# Patient Record
Sex: MALE | Race: WHITE | Employment: OTHER | RURAL
[De-identification: names, ages, dates, MRNs, and addresses within clinical notes are randomized per-mention and may not be internally consistent; named-entity substitution may affect disease eponyms.]

---

## 2017-04-17 RX ORDER — LABETALOL 100 MG/1
100 TABLET, FILM COATED ORAL 2 TIMES DAILY
Qty: 60 TAB | Refills: 11 | Status: SHIPPED | OUTPATIENT
Start: 2017-04-17 | End: 2018-04-13 | Stop reason: SDUPTHER

## 2017-08-21 ENCOUNTER — OFFICE VISIT (OUTPATIENT)
Dept: FAMILY MEDICINE CLINIC | Age: 82
End: 2017-08-21

## 2017-08-21 DIAGNOSIS — E11.9 TYPE 2 DIABETES MELLITUS WITHOUT COMPLICATION, WITHOUT LONG-TERM CURRENT USE OF INSULIN (HCC): ICD-10-CM

## 2017-08-21 DIAGNOSIS — Z00.00 MEDICARE ANNUAL WELLNESS VISIT, SUBSEQUENT: Primary | ICD-10-CM

## 2017-08-21 DIAGNOSIS — I10 ESSENTIAL HYPERTENSION: ICD-10-CM

## 2017-08-21 NOTE — ACP (ADVANCE CARE PLANNING)
In the event that he is unable to speak for himself, please contact his wife, Arya Kaplan, at [phone] [facility]

## 2017-08-21 NOTE — PROGRESS NOTES
Chief Complaint   Patient presents with    Annual Wellness Visit         HPI:      Jamari Pedersen is a 80 y.o. male who lives at [facility], [place], operated by [facility]. History of urticaria, HTN and T2DM. Has not been seen for several months. Due for labs and SAWV. As a result of a CT of the L/S spine in the fall of 2015, he was noted to have a 1.4 cm left adrenal adenoma and a 3.4 cm infrarenal AAA. AODM has been well controlled with diet. He has several skin concerns. FS at home are all < 200. SBP at home are all < 15o      Allergies   Allergen Reactions    Zyloprim [Allopurinol] Hives    Sulfa (Sulfonamide Antibiotics) Unknown (comments)    Sulfa (Sulfonamide Antibiotics) Other (comments)     Questions whether he really has a sensitivity.  Zyloprim [Allopurinol] Rash       Current Outpatient Prescriptions   Medication Sig    Vit A,C,E-Zinc-Copper (ICAPS AREDS) cap capsule Take 1 Cap by mouth.  ONETOUCH ULTRA TEST strip USE TO CHECK BLOOD SUGAR ONCE DAILY OR AS DIRECTED BY DOCTOR DANIELLA    lisinopril (PRINIVIL, ZESTRIL) 10 mg tablet TAKE ONE TABLET BY MOUTH ONCE DAILY    NIFEDICAL XL 60 mg ER tablet TAKE 1 TABLET(S) BY MOUTH DAILY    probenecid-colchicine (COLBENEMID) 500-0.5 mg per tablet TAKE ONE TABLET   BY MOUTH EVERY MORNING    labetalol (NORMODYNE) 100 mg tablet Take 1 Tab by mouth two (2) times a day.  raNITIdine (ZANTAC) 150 mg tablet TAKE ONE TABLET BY MOUTH TWICE DAILY    cyanocobalamin (VITAMIN B12) 1,000 mcg/mL injection 1 ML INJECTION EVERY MONTH    ranitidine hcl 150 mg capsule Take 150 mg by mouth two (2) times a day.  colchicine-probenecid (COLBENEMID) 0.5-500 mg per tablet Take 1 Tab by mouth daily.  NIFEdipine ER (NIFEDICAL XL) 60 mg ER tablet Take 60 mg by mouth daily.  LORazepam (ATIVAN) 1 mg tablet Take 1 mg by mouth as needed for Anxiety. Rarely uses. Maybe 1/2 tab in a day when he takes it.     multivitamin, tx-iron-ca-min (THERA-M W/ IRON) 9 mg iron-400 mcg tab tablet Take 1 Tab by mouth daily.  calcium-magnesium-zinc 333-133-5 mg tab Take  by mouth.  senna-docusate (SENOKOT-S) 8.6-50 mg per tablet Take 3 Tabs by mouth daily.  OTHER Will Leg Cramps. Two tabs q 4 hrs. Prn leg cramps. At least QID.  Propylene Glycol-Glycerin (SOOTHE LUBRICANT) 0.6-0.6 % dpet Apply  to eye as needed.  garlic 3 mg cap Take  by mouth daily. Two tabs every day,    clobetasol (TEMOVATE) 0.05 % ointment Apply  to affected area two (2) times a day.  aspirin (ASPIRIN) 325 mg tablet Take 325 mg by mouth two (2) times a day. No current facility-administered medications for this visit. Past Medical History:   Diagnosis Date    Adrenal cortical adenoma of left adrenal gland 2015    Incidental finding on L/S CT scan    Arthritis     CAD (coronary artery disease)     [facility] [address]     Gout     Hypertension     Low back pain radiating to right leg     Pernicious anemia     Renal stones     Stroke ([facility] [address]     Trigeminy          ROS:  Denies fever, chills, cough, chest pain, SOB,  nausea, vomiting, or diarrhea. Denies wt loss, wt gain, hemoptysis, hematochezia or melena. Physical Examination:    There were no vitals taken for this visit. General: Alert and Ox3, Fluent speech  HEENT:  NC/AT, EOMI, OP: clear  Neck:  Supple, no adenopathy, JVD, mass or bruit  Chest:  Clear to Ausculation, without wheezes, rales, rubs or ronchi  Cardiac: RRR  Abdomen:  +BS, soft, nontender without palpable HSM  Extremities:  No cyanosis, clubbing or edema  Neurologic:  Ambulatory without assist, CN 2-12 grossly intact. Moves all extremities. Skin: no rash  Lymphadenopathy: no cervical or supraclavicular nodes  Diabetic Foot Exam:  Both feet are examined and demonstrate intact DP pulses. There is good capillary refill and sensation is intact. Skin is intact and there are no ulcers or sores. ASSESSMENT AND PLAN:     1. SAWV today  2. T2DM:  Due for labs.   FS at home are excellent  3. Essential HTN:  At goal at home most of the time    Orders Placed This Encounter    CBC WITH AUTOMATED DIFF    LIPID PANEL    METABOLIC PANEL, COMPREHENSIVE    HEMOGLOBIN A1C WITH EAG    MICROALBUMIN, UR, RAND W/ MICROALBUMIN/CREA RATIO     DIABETES FOOT EXAM       Doyle Chong MD, FACP        ______________________________________________________________________    Marleen Parsons is a 80 y.o. male and presents for annual Medicare Wellness Visit. Problem List: Reviewed with patient and discussed risk factors. Patient Active Problem List   Diagnosis Code    Hypertension I10    CVA (cerebral infarction) I63.9    Esophageal stricture K22.2    Low back pain radiating to right leg M54.5    Actinic keratosis L57.0    Essential hypertension I10    Adrenal cortical adenoma of left adrenal gland D35.02    Type 2 diabetes mellitus without complication, without long-term current use of insulin (HCC) E11.9       Current medical providers:  Patient Care Team:  Carmen De La Cruz MD as PCP - General (Internal Medicine)  Abby Pop MD (Surgery)  Loulou Arauz MD (General Surgery)    [facility], [address] [place] [address], Medications/Allergies: reviewed, on chart. Male Alcohol Screening: On any occasion during the past 3 months, have you had more than 4 drinks containing alcohol? No    Do you average more than 14 drinks per week? No    ROS:  Constitutional: No fever, chills or weight loss  Respiratory: No cough, SOB   CV: No chest pain or Palpitations    Objective: There were no vitals taken for this visit. There is no height or weight on file to calculate BMI. Assessment of cognitive impairment: Alert and oriented x 3    Depression Screen:   PHQ over the last two weeks 8/18/2016   Little interest or pleasure in doing things Not at all   Feeling down, depressed or hopeless Not at all   Total Score PHQ 2 0       Fall Risk Assessment:    Fall Risk Assessment, last 12 mths 8/18/2016   Able to walk?  Yes Fall in past 12 months? No   Fall with injury? -   Number of falls in past 12 months -   Fall Risk Score -       Functional Ability:   Does the patient exhibit a steady gait? yes   How long did it take the patient to get up and walk from a sitting position? 12 sec   Is the patient self reliant?  (ie can do own laundry, meals, household chores)  yes     Does the patient handle his/her own medications? yes     Does the patient handle his/her own money? yes     Is the patients home safe (ie good lighting, handrails on stairs and bath, etc.)? yes     Did you notice or did patient express any hearing difficulties? yes     Did you notice or did patient express any vision difficulties? no       Advance Care Planning:   Patient was offered the opportunity to discuss advance care planning:  yes     Does patient have an Advance Directive:  yes   If no, did you provide information on Caring Connections?  no       Plan:      Orders Placed This Encounter    CBC WITH AUTOMATED DIFF    LIPID PANEL    METABOLIC PANEL, COMPREHENSIVE    HEMOGLOBIN A1C WITH EAG    MICROALBUMIN, UR, RAND W/ MICROALBUMIN/CREA RATIO     DIABETES FOOT EXAM       Health Maintenance   Topic Date Due    LIPID PANEL Q1  02/22/1932    Pneumococcal 65+ Low/Medium Risk (2 of 2 - PCV13) 01/02/2003    HEMOGLOBIN A1C Q6M  10/15/2016    MEDICARE YEARLY EXAM  01/21/2017    FOOT EXAM Q1  04/14/2017    MICROALBUMIN Q1  04/15/2017    EYE EXAM RETINAL OR DILATED Q1  08/08/2017    INFLUENZA AGE 9 TO ADULT  10/06/2017 (Originally 8/1/2017)    GLAUCOMA SCREENING Q2Y  08/08/2018    DTaP/Tdap/Td series (2 - Td) 08/20/2027    ZOSTER VACCINE AGE 60>  Addressed       *Patient verbalized understanding and agreement with the plan. A copy of the After Visit Summary with personalized health plan was given to the patient today.

## 2017-08-22 LAB
ALBUMIN SERPL-MCNC: 4.6 G/DL (ref 3.5–4.7)
ALBUMIN/CREAT UR: 51.1 MG/G CREAT (ref 0–30)
ALBUMIN/GLOB SERPL: 1.6 {RATIO} (ref 1.2–2.2)
ALP SERPL-CCNC: 115 IU/L (ref 39–117)
ALT SERPL-CCNC: 34 IU/L (ref 0–44)
AST SERPL-CCNC: 35 IU/L (ref 0–40)
BASOPHILS # BLD AUTO: 0.1 X10E3/UL (ref 0–0.2)
BASOPHILS NFR BLD AUTO: 1 %
BILIRUB SERPL-MCNC: 0.4 MG/DL (ref 0–1.2)
BUN SERPL-MCNC: 16 MG/DL (ref 8–27)
BUN/CREAT SERPL: 21 (ref 10–24)
CALCIUM SERPL-MCNC: 9.3 MG/DL (ref 8.6–10.2)
CHLORIDE SERPL-SCNC: 98 MMOL/L (ref 96–106)
CHOLEST SERPL-MCNC: 205 MG/DL (ref 100–199)
CO2 SERPL-SCNC: 23 MMOL/L (ref 18–29)
CREAT SERPL-MCNC: 0.76 MG/DL (ref 0.76–1.27)
CREAT UR-MCNC: 108.4 MG/DL
EOSINOPHIL # BLD AUTO: 0.3 X10E3/UL (ref 0–0.4)
EOSINOPHIL NFR BLD AUTO: 4 %
ERYTHROCYTE [DISTWIDTH] IN BLOOD BY AUTOMATED COUNT: 15.3 % (ref 12.3–15.4)
EST. AVERAGE GLUCOSE BLD GHB EST-MCNC: 160 MG/DL
GLOBULIN SER CALC-MCNC: 2.8 G/DL (ref 1.5–4.5)
GLUCOSE SERPL-MCNC: 143 MG/DL (ref 65–99)
HBA1C MFR BLD: 7.2 % (ref 4.8–5.6)
HCT VFR BLD AUTO: 41.9 % (ref 37.5–51)
HDLC SERPL-MCNC: 35 MG/DL
HGB BLD-MCNC: 14 G/DL (ref 12.6–17.7)
IMM GRANULOCYTES # BLD: 0 X10E3/UL (ref 0–0.1)
IMM GRANULOCYTES NFR BLD: 0 %
LDLC SERPL CALC-MCNC: 117 MG/DL (ref 0–99)
LYMPHOCYTES # BLD AUTO: 1.6 X10E3/UL (ref 0.7–3.1)
LYMPHOCYTES NFR BLD AUTO: 26 %
MCH RBC QN AUTO: 31 PG (ref 26.6–33)
MCHC RBC AUTO-ENTMCNC: 33.4 G/DL (ref 31.5–35.7)
MCV RBC AUTO: 93 FL (ref 79–97)
MICROALBUMIN UR-MCNC: 55.4 UG/ML
MONOCYTES # BLD AUTO: 0.5 X10E3/UL (ref 0.1–0.9)
MONOCYTES NFR BLD AUTO: 7 %
NEUTROPHILS # BLD AUTO: 3.8 X10E3/UL (ref 1.4–7)
NEUTROPHILS NFR BLD AUTO: 62 %
PLATELET # BLD AUTO: 191 X10E3/UL (ref 150–379)
POTASSIUM SERPL-SCNC: 4.4 MMOL/L (ref 3.5–5.2)
PROT SERPL-MCNC: 7.4 G/DL (ref 6–8.5)
RBC # BLD AUTO: 4.51 X10E6/UL (ref 4.14–5.8)
SODIUM SERPL-SCNC: 138 MMOL/L (ref 134–144)
TRIGL SERPL-MCNC: 265 MG/DL (ref 0–149)
VLDLC SERPL CALC-MCNC: 53 MG/DL (ref 5–40)
WBC # BLD AUTO: 6.1 X10E3/UL (ref 3.4–10.8)

## 2018-01-01 RX ORDER — TRIAMCINOLONE ACETONIDE 1 MG/G
CREAM TOPICAL
COMMUNITY
End: 2018-01-01 | Stop reason: SDUPTHER

## 2018-02-02 PROBLEM — E11.21 TYPE 2 DIABETES MELLITUS WITH NEPHROPATHY (HCC): Status: ACTIVE | Noted: 2018-02-02

## 2018-04-13 DIAGNOSIS — R13.14 PHARYNGOESOPHAGEAL DYSPHAGIA: ICD-10-CM

## 2018-04-13 RX ORDER — LABETALOL 100 MG/1
TABLET, FILM COATED ORAL
Qty: 60 TAB | Refills: 11 | Status: SHIPPED | OUTPATIENT
Start: 2018-04-13 | End: 2018-08-10 | Stop reason: DRUGHIGH

## 2018-04-13 RX ORDER — RANITIDINE 150 MG/1
TABLET, FILM COATED ORAL
Qty: 180 TAB | Refills: 11 | Status: SHIPPED | OUTPATIENT
Start: 2018-04-13 | End: 2018-01-01 | Stop reason: SDUPTHER

## 2018-04-13 RX ORDER — CYANOCOBALAMIN 1000 UG/ML
INJECTION, SOLUTION INTRAMUSCULAR; SUBCUTANEOUS
Qty: 3 ML | Refills: 11 | Status: SHIPPED | OUTPATIENT
Start: 2018-04-13 | End: 2018-01-01 | Stop reason: SDUPTHER

## 2018-08-10 ENCOUNTER — OFFICE VISIT (OUTPATIENT)
Dept: FAMILY MEDICINE CLINIC | Age: 83
End: 2018-08-10

## 2018-08-10 VITALS
RESPIRATION RATE: 18 BRPM | BODY MASS INDEX: 27.1 KG/M2 | HEART RATE: 48 BPM | OXYGEN SATURATION: 95 % | WEIGHT: 193.6 LBS | HEIGHT: 71 IN

## 2018-08-10 DIAGNOSIS — I10 ESSENTIAL HYPERTENSION: ICD-10-CM

## 2018-08-10 DIAGNOSIS — I25.10 CORONARY ARTERY DISEASE INVOLVING NATIVE HEART WITHOUT ANGINA PECTORIS, UNSPECIFIED VESSEL OR LESION TYPE: ICD-10-CM

## 2018-08-10 DIAGNOSIS — E11.9 TYPE 2 DIABETES MELLITUS WITHOUT COMPLICATION, WITHOUT LONG-TERM CURRENT USE OF INSULIN (HCC): ICD-10-CM

## 2018-08-10 DIAGNOSIS — E11.9 TYPE 2 DIABETES MELLITUS WITHOUT COMPLICATION, WITHOUT LONG-TERM CURRENT USE OF INSULIN (HCC): Primary | ICD-10-CM

## 2018-08-10 DIAGNOSIS — Z00.00 MEDICARE ANNUAL WELLNESS VISIT, SUBSEQUENT: Primary | ICD-10-CM

## 2018-08-10 RX ORDER — NITROGLYCERIN 0.4 MG/1
0.4 TABLET SUBLINGUAL
Qty: 25 TAB | Refills: 3 | Status: SHIPPED | OUTPATIENT
Start: 2018-08-10

## 2018-08-10 RX ORDER — NITROGLYCERIN 0.4 MG/1
0.4 TABLET SUBLINGUAL
Qty: 25 TAB | Refills: 3 | Status: SHIPPED | OUTPATIENT
Start: 2018-08-10 | End: 2018-08-10 | Stop reason: SDUPTHER

## 2018-08-10 RX ORDER — LABETALOL 200 MG/1
200 TABLET, FILM COATED ORAL 2 TIMES DAILY
Qty: 180 TAB | Refills: 4 | Status: SHIPPED | OUTPATIENT
Start: 2018-08-10 | End: 2018-01-01 | Stop reason: DRUGHIGH

## 2018-08-10 NOTE — PROGRESS NOTES
Chief Complaint   Patient presents with    Annual Wellness Visit         HPI:      Joe Andrea is a 80 y.o. male who lives at [facility] History of urticaria, HTN and T2DM. Has not been seen for several months. Due for labs and SAWV. As a result of a CT of the L/S spine in the fall of 2015, he was noted to have a 1.4 cm left adrenal adenoma and a 3.4 cm infrarenal AAA.  AODM has been well controlled with diet. He has several skin concerns. FS at home are all < 200. SBP at home are all < 15o      New Issues:  SAWV    Allergies   Allergen Reactions    Zyloprim [Allopurinol] Hives    Sulfa (Sulfonamide Antibiotics) Unknown (comments)    Sulfa (Sulfonamide Antibiotics) Other (comments)     Questions whether he really has a sensitivity.  Zyloprim [Allopurinol] Rash       Current Outpatient Prescriptions   Medication Sig    OTHER,NON-FORMULARY, Take 15 Drops by mouth daily. Indications: Organic Hemp Oil    camphor-menthol (SARNA ANTI-ITCH) 0.5-0.5 % lotion Apply  to affected area as needed for Itching.  BORON PO Take 3 mg by mouth daily.  cyanocobalamin (VITAMIN B12) 1,000 mcg/mL injection 1 ML INJECTION EVERY MONTH    potassium 99 mg tablet Take 99 mg by mouth daily.  magnesium 250 mg tab Take 250 mg by mouth daily.  triamcinolone acetonide (KENALOG) 0.1 % topical cream Apply  to affected area two (2) times a day. use thin layer    Vit A,C,E-Zinc-Copper (ICAPS AREDS) cap capsule Take 1 Cap by mouth two (2) times a day.  multivitamin, tx-iron-ca-min (THERA-M W/ IRON) 9 mg iron-400 mcg tab tablet Take 1 Tab by mouth daily.  senna-docusate (SENOKOT-S) 8.6-50 mg per tablet Take 3 Tabs by mouth daily.  garlic 3 mg cap Take  by mouth daily.  Two tabs every day,    probenecid-colchicine (COLBENEMID) 500-0.5 mg per tablet TAKE ONE TABLET   BY MOUTH EVERY MORNING    lisinopril (PRINIVIL, ZESTRIL) 10 mg tablet TAKE ONE TABLET BY MOUTH ONCE DAILY    NIFEdipine ER (PROCARDIA XL) 60 mg ER tablet TAKE 1 TABLET(S) BY MOUTH DAILY    raNITIdine (ZANTAC) 150 mg tablet TAKE ONE TABLET BY MOUTH TWICE DAILY    labetalol (NORMODYNE) 100 mg tablet TAKE 1 TAB BY MOUTH TWO (2) TIMES A DAY.  peg 400-propylene glycol (SYSTANE, PROPYLENE GLYCOL,) 0.4-0.3 % drop 1 Drop two (2) times daily as needed.  cephALEXin (KEFLEX) 500 mg capsule Take 500 mg by mouth four (4) times daily. 4 tabs 1 hour before dental appointments.  ONETOUCH ULTRA TEST strip USE TO CHECK BLOOD SUGAR ONCE DAILY OR AS DIRECTED BY DOCTOR DANIELLA CAMPBELL [facility] Leg Cramps. Two tabs q 4 hrs. Prn leg cramps. At least QID.  aspirin (ASPIRIN) 325 mg tablet Take 325 mg by mouth two (2) times a day. No current facility-administered medications for this visit. 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 Denies wt loss, wt gain, hemoptysis, hematochezia or melena. Physical Examination:    Pulse (!) 48  Resp 18  Ht 5' 11\" (1.803 m)  Wt 193 lb 9.6 oz (87.8 kg)  SpO2 95%  BMI 27 kg/m2    General: Alert and Ox3, Fluent speech  HEENT:  NC/AT, EOMI, OP: clear  Neck:  Supple, no adenopathy, JVD, mass or bruit  Chest:  Clear to Ausculation, without wheezes, rales, rubs or ronchi  Cardiac: RRR  Abdomen:  +BS, soft, nontender without palpable HSM  Extremities:  No cyanosis, clubbing or edema  Neurologic:  Ambulatory without assist, CN 2-12 grossly intact.  Moves all extremities. Skin: no rash  Lymphadenopathy: no cervical or supraclavicular nodes  Diabetic Foot Exam:  Both feet are examined and demonstrate intact DP pulses.   There is good capillary refill and sensation is intact. Skin is intact and there are no ulcers or sores. ASSESSMENT AND PLAN:     1. SAWV today  2. T2DM:  Due for labs. FS at home are excellent  3. Essential HTN:  At goal at home most of the time    Orders Placed This Encounter    HEMOGLOBIN A1C WITH EAG    LIPID PANEL    METABOLIC PANEL, COMPREHENSIVE    TSH 3RD GENERATION    CBC WITH AUTOMATED DIFF    OTHER,NON-FORMULARY,     Sig: Take 15 Drops by mouth daily. Indications: Organic Hemp Oil    camphor-menthol (SARNA ANTI-ITCH) 0.5-0.5 % lotion     Sig: Apply  to affected area as needed for Itching.  BORON PO     Sig: Take 3 mg by mouth daily. Domi Jacob MD, FACP        ______________________________________________________________________    Alise Pabon is a 80 y.o. male and presents for annual Medicare Wellness Visit. Problem List: Reviewed with patient and discussed risk factors. Patient Active Problem List   Diagnosis Code    Hypertension I10    CVA (cerebral infarction) I63.9    Esophageal stricture K22.2    Low back pain radiating to right leg M54.5    Actinic keratosis L57.0    Essential hypertension I10    Adrenal cortical adenoma of left adrenal gland D35.02    Type 2 diabetes mellitus without complication, without long-term current use of insulin (HCC) E11.9    Type 2 diabetes mellitus with nephropathy (HCC) E11.21       Current medical providers:  Patient Care Team:  Carolin Farias MD as PCP - General (Internal Medicine)  Jl De Leon MD (Surgery)  Sushma Avalos MD (General Surgery)    [facility], Medications/Allergies: reviewed, on chart. Male Alcohol Screening: On any occasion during the past 3 months, have you had more than 4 drinks containing alcohol? No    Do you average more than 14 drinks per week?   No    ROS:  Constitutional: No fever, chills or weight loss  Respiratory: No cough, SOB   CV: No chest pain or Palpitations    Objective:  Visit Vitals    Pulse (!) 48    Resp 18    Ht 5' 11\" (1.803 m)    Wt 193 lb 9.6 oz (87.8 kg)    SpO2 95%    BMI 27 kg/m2    Body mass index is 27 kg/(m^2). Assessment of cognitive impairment: Alert and oriented x 3    Depression Screen:   PHQ over the last two weeks 2/5/2018   Little interest or pleasure in doing things Not at all   Feeling down, depressed, irritable, or hopeless Not at all   Total Score PHQ 2 0       Fall Risk Assessment:    Fall Risk Assessment, last 12 mths 2/5/2018   Able to walk? Yes   Fall in past 12 months? Yes   Fall with injury? -   Number of falls in past 12 months 1   Fall Risk Score -       Functional Ability:   Does the patient exhibit a steady gait? yes   How long did it take the patient to get up and walk from a sitting position? 12 sec   Is the patient self reliant?  (ie can do own laundry, meals, household chores)  yes     Does the patient handle his/her own medications? yes     Does the patient handle his/her own money? yes     Is the patients home safe (ie good lighting, handrails on stairs and bath, etc.)? yes     Did you notice or did patient express any hearing difficulties? yes     Did you notice or did patient express any vision difficulties?    no       Advance Care Planning:   Patient was offered the opportunity to discuss advance care planning:  yes     Does patient have an Advance Directive:  yes   If no, did you provide information on Caring Connections?  no       Plan:      Orders Placed This Encounter    HEMOGLOBIN A1C WITH EAG    LIPID PANEL    METABOLIC PANEL, COMPREHENSIVE    TSH 3RD GENERATION    CBC WITH AUTOMATED DIFF    OTHER,NON-FORMULARY,    camphor-menthol (SARNA ANTI-ITCH) 0.5-0.5 % lotion    BORON PO       Health Maintenance   Topic Date Due    Pneumococcal 65+ Low/Medium Risk (2 of 2 - PCV13) 01/02/2003    EYE EXAM RETINAL OR DILATED Q1  08/08/2017    HEMOGLOBIN A1C Q6M  02/21/2018    Influenza Age 9 to Adult  08/01/2018    GLAUCOMA SCREENING Q2Y  08/08/2018    FOOT EXAM Q1  08/21/2018    MICROALBUMIN Q1  08/21/2018    LIPID PANEL Q1  08/21/2018    MEDICARE YEARLY EXAM  08/22/2018    DTaP/Tdap/Td series (2 - Td) 11/13/2027    ZOSTER VACCINE AGE 60>  Addressed       *Patient verbalized understanding and agreement with the plan. A copy of the After Visit Summary with personalized health plan was given to the patient today.

## 2018-08-10 NOTE — MR AVS SNAPSHOT
[address] [facility] [phone] Patient: Asim Felipe MRN: [id number] DNT:[id number] Visit Information Date & Time Provider Department Dept. Phone Encounter #  
 8/10/2018 11:30 AM Selena Thompson MD Valdemar Encarnacion [id number] Follow-up Instructions Return in about 6 months (around 2/10/2019). Follow-up and Disposition History Upcoming Health Maintenance Date Due Pneumococcal 65+ Low/Medium Risk (2 of 2 - PCV13) 1/2/2003 EYE EXAM RETINAL OR DILATED Q1 8/8/2017 HEMOGLOBIN A1C Q6M 2/21/2018 Influenza Age 5 to Adult 8/1/2018 GLAUCOMA SCREENING Q2Y 8/8/2018 FOOT EXAM Q1 8/21/2018 MICROALBUMIN Q1 8/21/2018 LIPID PANEL Q1 8/21/2018 MEDICARE YEARLY EXAM 8/22/2018 DTaP/Tdap/Td series (2 - Td) 11/13/2027 Allergies as of 8/10/2018  Review Complete On: 8/10/2018 By: Pascale Gay RN Severity Noted Reaction Type Reactions Zyloprim [Allopurinol] High 08/18/2016   Systemic Hives Sulfa (Sulfonamide Antibiotics)  12/05/2013    Unknown (comments) Sulfa (Sulfonamide Antibiotics)  08/18/2016    Other (comments) Questions whether he really has a sensitivity. Zyloprim [Allopurinol]  12/05/2013    Rash Current Immunizations  Never Reviewed Name Date Influenza High Dose Vaccine PF 11/13/2017, 11/13/2017, 11/16/2016 Pneumococcal Polysaccharide (PPSV-23) 1/2/2002 Td 10/30/2006 Tdap 11/13/2017 Not reviewed this visit You Were Diagnosed With   
  
 Codes Comments Medicare annual wellness visit, subsequent    -  Primary ICD-10-CM: Z00.00 ICD-9-CM: V70.0 Type 2 diabetes mellitus without complication, without long-term current use of insulin (HCC)     ICD-10-CM: E11.9 ICD-9-CM: 250.00 Essential hypertension     ICD-10-CM: I10 
ICD-9-CM: 401.9 Vitals Pulse Resp Height(growth percentile) Weight(growth percentile) SpO2 BMI  
 (!) 48 18 5' 11\" (1.803 m) 193 lb 9.6 oz (87.8 kg) 95% 27 kg/m2 Smoking Status Never Smoker BMI and BSA Data Body Mass Index Body Surface Area  
 27 kg/m 2 2.1 m 2 Preferred Pharmacy Pharmacy Name Phone [phone] [place], [place] [address] Your Updated Medication List  
  
   
This list is accurate as of 8/10/18 12:47 PM.  Always use your most recent med list.  
  
  
  
  
 aspirin 325 mg tablet Commonly known as:  ASPIRIN Take 325 mg by mouth two (2) times a day. BORON PO Take 3 mg by mouth daily. cephALEXin 500 mg capsule Commonly known as:  Dellia Cons Take 500 mg by mouth four (4) times daily. 4 tabs 1 hour before dental appointments. cyanocobalamin 1,000 mcg/mL injection Commonly known as:  VITAMIN B12  
1 ML INJECTION EVERY MONTH  
  
 garlic 3 mg Cap Take  by mouth daily. Two tabs every day, ICAPS AREDS Cap capsule Generic drug:  Vit A,C,E-Zinc-Copper Take 1 Cap by mouth two (2) times a day. labetalol 200 mg tablet Commonly known as:  Trung Barges Take 1 Tab by mouth two (2) times a day. lisinopril 10 mg tablet Commonly known as:  PRINIVIL, ZESTRIL  
TAKE ONE TABLET BY MOUTH ONCE DAILY  
  
 magnesium 250 mg Tab Take 250 mg by mouth daily. multivitamin, tx-iron-ca-min 9 mg iron-400 mcg Tab tablet Commonly known as:  THERA-M w/ IRON Take 1 Tab by mouth daily. NIFEdipine ER 60 mg ER tablet Commonly known as:  PROCARDIA XL  
TAKE 1 TABLET(S) BY MOUTH DAILY  
  
 nitroglycerin 0.4 mg SL tablet Commonly known as:  NITROSTAT  
1 Tab by SubLINGual route every five (5) minutes as needed for Chest Pain. Up to 3 doses. ONETOUCH ULTRA TEST strip Generic drug:  glucose blood VI test strips USE TO CHECK BLOOD SUGAR ONCE DAILY OR AS DIRECTED BY DOCTOR BREWER  
  
 OTHER  
 Will Leg Cramps. Two tabs q 4 hrs. Prn leg cramps. At least QID. OTHER(NON-FORMULARY) Take 15 Drops by mouth daily. Indications: Organic Hemp Oil  
  
 potassium 99 mg tablet Take 99 mg by mouth daily. probenecid-colchicine 500-0.5 mg per tablet Commonly known as:  ColBenemid TAKE ONE TABLET   BY MOUTH EVERY MORNING  
  
 raNITIdine 150 mg tablet Commonly known as:  ZANTAC TAKE ONE TABLET BY MOUTH TWICE DAILY SARNA ANTI-ITCH 0.5-0.5 % lotion Generic drug:  camphor-menthol Apply  to affected area as needed for Itching. SENOKOT-S 8.6-50 mg per tablet Generic drug:  senna-docusate Take 3 Tabs by mouth daily. SYSTANE (PROPYLENE GLYCOL) 0.4-0.3 % Drop Generic drug:  peg 400-propylene glycol 1 Drop two (2) times daily as needed. triamcinolone acetonide 0.1 % topical cream  
Commonly known as:  KENALOG Apply  to affected area two (2) times a day. use thin layer We Performed the Following CBC WITH AUTOMATED DIFF [20694 CPT(R)] HEMOGLOBIN A1C WITH EAG [58274 CPT(R)] LIPID PANEL [79461 CPT(R)] METABOLIC PANEL, COMPREHENSIVE [86387 CPT(R)] TSH 3RD GENERATION [21835 CPT(R)] Follow-up Instructions Return in about 6 months (around 2/10/2019). Introducing [facility]! Kavita Parry introduces [employer] patient portal. Now you can access parts of your medical record, email your doctor's office, and request medication refills online. 1. In your internet browser, go to [email or web address] 2. Click on the First Time User? Click Here link in the Sign In box. You will see the New Member Sign Up page. 3. Enter your [employer] Access Code exactly as it appears below. You will not need to use this code after youve completed the sign-up process. If you do not sign up before the expiration date, you must request a new code. · [employer] Access Code: [email or web address] Expires: 11/8/2018 11:44 AM 
 
 4. Enter the last four digits of your Social Security Number (xxxx) and Date of Birth (mm/dd/yyyy) as indicated and click Submit. You will be taken to the next sign-up page. 5. Create a [employer] ID. This will be your [employer] login ID and cannot be changed, so think of one that is secure and easy to remember. 6. Create a [employer] password. You can change your password at any time. 7. Enter your Password Reset Question and Answer. This can be used at a later time if you forget your password. 8. Enter your e-mail address. You will receive e-mail notification when new information is available in [address] 9. Click Sign Up. You can now view and download portions of your medical record. 10. Click the Download Summary menu link to download a portable copy of your medical information. If you have questions, please visit the Frequently Asked Questions section of the [employer] website. Remember, [employer] is NOT to be used for urgent needs. For medical emergencies, dial 911. Now available from your iPhone and Android! Please provide this summary of care documentation to your next provider. Your primary care clinician is listed as Zurdo Corado. If you have any questions after today's visit, please call [phone].

## 2018-08-11 LAB
ALBUMIN SERPL-MCNC: 4.5 G/DL (ref 3.5–4.7)
ALBUMIN/GLOB SERPL: 1.7 {RATIO} (ref 1.2–2.2)
ALP SERPL-CCNC: 143 IU/L (ref 39–117)
ALT SERPL-CCNC: 20 IU/L (ref 0–44)
AST SERPL-CCNC: 28 IU/L (ref 0–40)
BASOPHILS # BLD AUTO: 0 X10E3/UL (ref 0–0.2)
BASOPHILS NFR BLD AUTO: 1 %
BILIRUB SERPL-MCNC: 0.3 MG/DL (ref 0–1.2)
BUN SERPL-MCNC: 19 MG/DL (ref 8–27)
BUN/CREAT SERPL: 23 (ref 10–24)
CALCIUM SERPL-MCNC: 9.4 MG/DL (ref 8.6–10.2)
CHLORIDE SERPL-SCNC: 101 MMOL/L (ref 96–106)
CHOLEST SERPL-MCNC: 178 MG/DL (ref 100–199)
CO2 SERPL-SCNC: 22 MMOL/L (ref 20–29)
CREAT SERPL-MCNC: 0.84 MG/DL (ref 0.76–1.27)
EOSINOPHIL # BLD AUTO: 0.3 X10E3/UL (ref 0–0.4)
EOSINOPHIL NFR BLD AUTO: 4 %
ERYTHROCYTE [DISTWIDTH] IN BLOOD BY AUTOMATED COUNT: 15.1 % (ref 12.3–15.4)
EST. AVERAGE GLUCOSE BLD GHB EST-MCNC: 151 MG/DL
GLOBULIN SER CALC-MCNC: 2.6 G/DL (ref 1.5–4.5)
GLUCOSE SERPL-MCNC: 161 MG/DL (ref 65–99)
HBA1C MFR BLD: 6.9 % (ref 4.8–5.6)
HCT VFR BLD AUTO: 39.4 % (ref 37.5–51)
HDLC SERPL-MCNC: 31 MG/DL
HGB BLD-MCNC: 12.9 G/DL (ref 13–17.7)
IMM GRANULOCYTES # BLD: 0 X10E3/UL (ref 0–0.1)
IMM GRANULOCYTES NFR BLD: 0 %
LDLC SERPL CALC-MCNC: 90 MG/DL (ref 0–99)
LYMPHOCYTES # BLD AUTO: 1.7 X10E3/UL (ref 0.7–3.1)
LYMPHOCYTES NFR BLD AUTO: 25 %
MCH RBC QN AUTO: 31.3 PG (ref 26.6–33)
MCHC RBC AUTO-ENTMCNC: 32.7 G/DL (ref 31.5–35.7)
MCV RBC AUTO: 96 FL (ref 79–97)
MONOCYTES # BLD AUTO: 0.4 X10E3/UL (ref 0.1–0.9)
MONOCYTES NFR BLD AUTO: 6 %
NEUTROPHILS # BLD AUTO: 4.5 X10E3/UL (ref 1.4–7)
NEUTROPHILS NFR BLD AUTO: 64 %
PLATELET # BLD AUTO: 231 X10E3/UL (ref 150–379)
POTASSIUM SERPL-SCNC: 4.2 MMOL/L (ref 3.5–5.2)
PROT SERPL-MCNC: 7.1 G/DL (ref 6–8.5)
RBC # BLD AUTO: 4.12 X10E6/UL (ref 4.14–5.8)
SODIUM SERPL-SCNC: 140 MMOL/L (ref 134–144)
TRIGL SERPL-MCNC: 285 MG/DL (ref 0–149)
TSH SERPL DL<=0.005 MIU/L-ACNC: 1.45 UIU/ML (ref 0.45–4.5)
VLDLC SERPL CALC-MCNC: 57 MG/DL (ref 5–40)
WBC # BLD AUTO: 6.8 X10E3/UL (ref 3.4–10.8)

## 2019-01-01 ENCOUNTER — TELEPHONE (OUTPATIENT)
Dept: ONCOLOGY | Age: 84
End: 2019-01-01

## 2019-01-01 ENCOUNTER — PATIENT OUTREACH (OUTPATIENT)
Dept: INTERNAL MEDICINE CLINIC | Age: 84
End: 2019-01-01

## 2019-01-01 DIAGNOSIS — C78.7 LIVER METASTASIS (HCC): Primary | ICD-10-CM

## 2019-04-15 PROBLEM — I50.9 CHF (CONGESTIVE HEART FAILURE) (HCC): Status: ACTIVE | Noted: 2019-01-01

## 2019-04-15 PROBLEM — E11.9 TYPE 2 DIABETES MELLITUS WITHOUT COMPLICATION, WITHOUT LONG-TERM CURRENT USE OF INSULIN (HCC): Status: RESOLVED | Noted: 2017-08-21 | Resolved: 2019-01-01

## 2019-04-16 PROBLEM — I50.31 ACUTE DIASTOLIC CONGESTIVE HEART FAILURE (HCC): Status: ACTIVE | Noted: 2019-01-01

## 2019-04-16 PROBLEM — I50.9 CHF (CONGESTIVE HEART FAILURE) (HCC): Status: ACTIVE | Noted: 2019-01-01

## 2019-08-13 PROBLEM — F01.50 VASCULAR DEMENTIA WITHOUT BEHAVIORAL DISTURBANCE (HCC): Status: ACTIVE | Noted: 2019-01-01

## 2019-09-10 PROBLEM — E11.9 DM TYPE 2 (DIABETES MELLITUS, TYPE 2) (HCC): Chronic | Status: ACTIVE | Noted: 2018-02-02

## 2019-09-10 PROBLEM — R53.1 WEAKNESS GENERALIZED: Status: ACTIVE | Noted: 2019-01-01

## 2019-09-10 PROBLEM — I48.91 ATRIAL FIBRILLATION WITH RVR (HCC): Status: ACTIVE | Noted: 2019-01-01

## 2019-09-10 PROBLEM — E11.9 DM TYPE 2 (DIABETES MELLITUS, TYPE 2) (HCC): Status: ACTIVE | Noted: 2018-02-02

## 2019-09-10 PROBLEM — E11.21 TYPE 2 DIABETES MELLITUS WITH NEPHROPATHY (HCC): Chronic | Status: ACTIVE | Noted: 2018-02-02

## 2019-09-10 PROBLEM — I50.33 ACUTE ON CHRONIC DIASTOLIC HEART FAILURE (HCC): Status: ACTIVE | Noted: 2019-01-01

## 2019-09-11 PROBLEM — F01.50 VASCULAR DEMENTIA WITHOUT BEHAVIORAL DISTURBANCE (HCC): Chronic | Status: ACTIVE | Noted: 2019-01-01

## 2019-09-11 PROBLEM — E87.20 LACTIC ACIDOSIS: Status: ACTIVE | Noted: 2019-01-01

## 2019-09-11 PROBLEM — I50.21 ACUTE SYSTOLIC CHF (CONGESTIVE HEART FAILURE) (HCC): Status: ACTIVE | Noted: 2019-01-01

## 2019-09-12 PROBLEM — N39.0 UTI (URINARY TRACT INFECTION): Status: ACTIVE | Noted: 2019-01-01

## 2019-09-13 PROBLEM — R13.10 DYSPHAGIA: Status: ACTIVE | Noted: 2019-01-01

## 2019-09-14 PROBLEM — B96.20 E. COLI UTI: Status: ACTIVE | Noted: 2019-01-01

## 2019-09-15 PROBLEM — R16.0 LIVER MASS: Status: ACTIVE | Noted: 2019-01-01

## 2019-09-19 NOTE — PROGRESS NOTES
Transition of Care Coordination/Hospital to Post Acute Facility: 
  
Date/Time:  2019 11:54 AM 
 
Patient was admitted to [facility] on 9/10 for treatment of DX. ACUTE SYSTOLIC HEART FAILURE. Patient was discharged  to  [address] [place] [address] for continuation of care. Inpatient RRAT score: 37 Top Challenges reviewed with the provider Component 2019 NT pro-BNP 
    0 - 450 PG/ML 22,971 (H) Component 2019 2019 9/10/2019 Troponin-I, Qt. 
    <0.05 ng/mL   0.11 (H) CT CHEST W CONT -- Small hiatal hernia, Bilateral pleural effusions with bibasilar atelectasis and/or consolidation, Heterogeneous appearance of the liver with suggesting of underlying mass 9/10 - XR CHEST PORT - IMPRESSION: Minimal patchy bibasilar opacification  ECHO -- ejection fraction is 36 - 40% CEA 8.8 Continue with 1.5 L/24 hr fluid restriction Method of communication with provider :chart routing Inpatient RRAT score: 37 Was this a readmission? no  
 
Nurse Navigator(NN) spoke with Nursing Supervisor Ike Young to provide introduction to self and explanation of the Nurse Navigator Role. Verified name and  as patient identifiers. (Nurse Tong Gordon) reports pt.attending physicians while residing at the [facility] will be Dr. Ceferino Garcia and Jessica Curry NP, patient receiving PT/ST/OT, LOS undetermined at this time, CTN will f/u with pt.progress in 1-2 weeks. - W Oswaldo Arndt Advance Care Planning:  
Does patient have an Advance Directive:  [id number] Medication(s):  
New Medications at Discharge:  
magnesium oxide (MAG-OX) 400 mg tablet Take 1 Tab by mouth daily. Qty: 14 Tab, Refills: 0  
   
apixaban (ELIQUIS) 5 mg tablet Take 1 Tab by mouth every twelve (12) hours. Qty: 60 Tab, Refills: 1  
   
atorvastatin (LIPITOR) 20 mg tablet Take 1 Tab by mouth nightly.  
Qty: 30 Tab, Refills: 1  
   
 bumetanide (BUMEX) 1 mg tablet Take 1 Tab by mouth two (2) times a day. Qty: 60 Tab, Refills: 0  
   
losartan (COZAAR) 25 mg tablet Take 1 Tab by mouth daily. Qty: 30 Tab, Refills: 1  
   
metoprolol tartrate (LOPRESSOR) 100 mg IR tablet Take 1 Tab by mouth every twelve (12) hours. Qty: 60 Tab, Refills: 1  
   
spironolactone (ALDACTONE) 50 mg tablet Take 1 Tab by mouth daily. Qty: 30 Tab, Refills Discontinued Medications at Discharge:  
lisinopril (PRINIVIL, ZESTRIL) 20 mg tablet Comments:  
Reason for Stopping:    
     
  furosemide (LASIX) 40 mg tablet Comments:  
Reason for Stopping:   
     
  labetalol (NORMODYNE) 100 mg tablet Comments:  
Reason for Stopping:   
     
  magnesium 250 mg tab Referral to Pharm D needed: no  
 
Current Outpatient Medications Medication Sig  
 magnesium oxide (MAG-OX) 400 mg tablet Take 1 Tab by mouth daily.  apixaban (ELIQUIS) 5 mg tablet Take 1 Tab by mouth every twelve (12) hours.  atorvastatin (LIPITOR) 20 mg tablet TAKE 1 TABLET BY MOUTH EVERY NIGHT  bumetanide (BUMEX) 1 mg tablet TAKE 1 TABLET BY MOUTH TWICE DAILY  metoprolol tartrate (LOPRESSOR) 100 mg IR tablet TAKE 1 TABLET BY MOUTH EVERY 12 HOURS  spironolactone (ALDACTONE) 50 mg tablet TAKE 1 TABLET BY MOUTH DAILY  losartan (COZAAR) 25 mg tablet TAKE 1 TABLET BY MOUTH DAILY  raNITIdine (ZANTAC) 150 mg tablet Take 150 mg by mouth two (2) times a day.  cyanocobalamin (VITAMIN B12) 1,000 mcg/mL injection INJECT 1 ML INTO THE MUSCLE EVERY MONTH  potassium chloride SR (K-TAB) 20 mEq tablet Take 1 Tab by mouth daily.  aspirin 81 mg chewable tablet Take 1 Tab by mouth daily.  triamcinolone acetonide (KENALOG) 0.1 % topical cream APPLY TO AFFECTED AREA THREE TIMES DAILY AS NEEDED FOR SKIN IRRITATION OR ITCHING. USE A THIN LAYER  
 glucose blood VI test strips (ONETOUCH ULTRA BLUE TEST STRIP) strip E11.9  probenecid-colchicine (COLBENEMID) 500-0.5 mg per tablet TAKE ONE TABLET   BY MOUTH EVERY MORNING  
 camphor-menthol (SARNA ANTI-ITCH) 0.5-0.5 % lotion Apply  to affected area as needed for Itching.  BORON PO Take 3 mg by mouth daily.  nitroglycerin (NITROSTAT) 0.4 mg SL tablet 1 Tab by SubLINGual route every five (5) minutes as needed for Chest Pain. Up to 3 doses.  peg 400-propylene glycol (SYSTANE, PROPYLENE GLYCOL,) 0.4-0.3 % drop 1 Drop two (2) times daily as needed.  Vit A,C,E-Zinc-Copper (ICAPS AREDS) cap capsule Take 1 Cap by mouth two (2) times a day.  multivitamin, tx-iron-ca-min (THERA-M W/ IRON) 9 mg iron-400 mcg tab tablet Take 1 Tab by mouth daily.  senna-docusate (SENOKOT-S) 8.6-50 mg per tablet Take 3 Tabs by mouth daily.  garlic 3 mg cap Take  by mouth daily. Two tabs every day, No current facility-administered medications for this visit. [facility] follow up appointment(s):  
Future Appointments Date Time Provider Karine Rosanne 9/20/2019 11:50 AM Emmanuelle Aguilar MD [facility]  
9/26/2019  2:00 PM [facility]

## 2019-10-18 ENCOUNTER — PATIENT OUTREACH (OUTPATIENT)
Dept: INTERNAL MEDICINE CLINIC | Age: 84
End: 2019-10-18